# Patient Record
Sex: FEMALE | Race: WHITE | NOT HISPANIC OR LATINO | URBAN - METROPOLITAN AREA
[De-identification: names, ages, dates, MRNs, and addresses within clinical notes are randomized per-mention and may not be internally consistent; named-entity substitution may affect disease eponyms.]

---

## 2021-02-04 ENCOUNTER — OFFICE VISIT (OUTPATIENT)
Dept: URBAN - METROPOLITAN AREA CLINIC 78 | Facility: CLINIC | Age: 66
End: 2021-02-04
Payer: MEDICARE

## 2021-02-04 VITALS
TEMPERATURE: 98.2 F | HEART RATE: 93 BPM | HEIGHT: 64 IN | DIASTOLIC BLOOD PRESSURE: 81 MMHG | WEIGHT: 170.2 LBS | BODY MASS INDEX: 29.06 KG/M2 | SYSTOLIC BLOOD PRESSURE: 133 MMHG | RESPIRATION RATE: 16 BRPM

## 2021-02-04 DIAGNOSIS — Q43.8 REDUNDANT COLON: ICD-10-CM

## 2021-02-04 DIAGNOSIS — Z12.11 SCREENING COLONOSCOPY: ICD-10-CM

## 2021-02-04 DIAGNOSIS — L90.5 SCAR OF ABDOMINAL WALL: ICD-10-CM

## 2021-02-04 DIAGNOSIS — Q63.1 HORSESHOE KIDNEY: ICD-10-CM

## 2021-02-04 DIAGNOSIS — R19.00 RETROPERITONEAL MASS: ICD-10-CM

## 2021-02-04 DIAGNOSIS — G20 PARKINSON DISEASE: ICD-10-CM

## 2021-02-04 DIAGNOSIS — R26.2 AMBULATORY DYSFUNCTION: ICD-10-CM

## 2021-02-04 DIAGNOSIS — K59.01 CONSTIPATION BY DELAYED COLONIC TRANSIT: ICD-10-CM

## 2021-02-04 DIAGNOSIS — Q05.9 SPINA BIFIDA: ICD-10-CM

## 2021-02-04 PROBLEM — 10331000132107 TORTUOUS COLON: Status: ACTIVE | Noted: 2021-02-04

## 2021-02-04 PROBLEM — 41729002 HORSESHOE KIDNEY: Status: ACTIVE | Noted: 2021-02-04

## 2021-02-04 PROBLEM — 67531005 SPINA BIFIDA: Status: ACTIVE | Noted: 2021-02-04

## 2021-02-04 PROCEDURE — 99204 OFFICE O/P NEW MOD 45 MIN: CPT | Performed by: INTERNAL MEDICINE

## 2021-02-04 PROCEDURE — G8482 FLU IMMUNIZE ORDER/ADMIN: HCPCS | Performed by: INTERNAL MEDICINE

## 2021-02-04 RX ORDER — SODIUM, POTASSIUM,MAG SULFATES 17.5-3.13G
177 ML DAY 1 AND 177 ML DAY 2 SOLUTION, RECONSTITUTED, ORAL ORAL
Qty: 354 MILLILITER | Refills: 0 | OUTPATIENT
Start: 2021-02-04

## 2021-02-04 RX ORDER — ALENDRONATE SODIUM 70 MG/1
1 TABLET 30 MINUTES BEFORE THE FIRST FOOD, BEVERAGE OR MEDICINE OF THE DAY WITH PLAIN WATER TABLET ORAL
Status: ACTIVE | COMMUNITY

## 2021-02-04 RX ORDER — CARBIDOPA, LEVODOPA AND ENTACAPONE 25; 100; 200 MG/1; MG/1; MG/1
1 TABLET TABLET, FILM COATED ORAL ONCE A DAY
Status: ACTIVE | COMMUNITY

## 2021-02-04 RX ORDER — PSYLLIUM HUSK 0.4 G
2 CAPSULES WITH 8 OUNCES OF LIQUID CAPSULE ORAL THREE TIMES A DAY
Status: ACTIVE | COMMUNITY

## 2021-02-04 RX ORDER — LINACLOTIDE 145 UG/1
1 CAPSULE AT LEAST 30 MINUTES BEFORE THE FIRST MEAL OF THE DAY ON AN EMPTY STOMACH CAPSULE, GELATIN COATED ORAL ONCE A DAY
Status: ACTIVE | COMMUNITY

## 2021-02-04 RX ORDER — LINACLOTIDE 290 UG/1
1 CAPSULE AT LEAST 30 MINUTES BEFORE THE FIRST MEAL OF THE DAY ON AN EMPTY STOMACH CAPSULE, GELATIN COATED ORAL ONCE A DAY
Qty: 90 | Refills: 0 | OUTPATIENT

## 2021-02-04 NOTE — HPI-TODAY'S VISIT:
Patient was referred by Dr. Jo-Ann Henning  A copy of this document will be sent to the physician.   Patient has personal history of mentally Challenged female with personal hx of SPina bifida  with Parkinsons disease and lives at [facility] living facility   Patient has constipation  No BM  for 1 weeks  PCP has started her on Linzess 145 since Oct 2020 but is is not wirking  May years ago she went to hospital for constipation  Last colonoscopy was 10 years ago    Personal history of pelvic mass  s/p appendectomy , extensive retroperitoneal dissection with palpation of right retroperitoneal mass  Left iliac note biopsy in 2009   CT scan 2003  [facility]  Cyst in right ovary 5.5 cm    Patient wears a pull up for accidents mostly bladder

## 2021-04-21 ENCOUNTER — TELEPHONE ENCOUNTER (OUTPATIENT)
Dept: URBAN - METROPOLITAN AREA CLINIC 78 | Facility: CLINIC | Age: 66
End: 2021-04-21

## 2022-12-22 ENCOUNTER — P2P PATIENT RECORD (OUTPATIENT)
Age: 67
End: 2022-12-22

## 2023-01-24 ENCOUNTER — WEB ENCOUNTER (OUTPATIENT)
Dept: URBAN - METROPOLITAN AREA CLINIC 78 | Facility: CLINIC | Age: 68
End: 2023-01-24

## 2023-01-30 ENCOUNTER — OFFICE VISIT (OUTPATIENT)
Dept: URBAN - METROPOLITAN AREA CLINIC 78 | Facility: CLINIC | Age: 68
End: 2023-01-30
Payer: MEDICARE

## 2023-01-30 ENCOUNTER — DASHBOARD ENCOUNTERS (OUTPATIENT)
Age: 68
End: 2023-01-30

## 2023-01-30 VITALS
DIASTOLIC BLOOD PRESSURE: 70 MMHG | HEIGHT: 64 IN | HEART RATE: 81 BPM | TEMPERATURE: 97.8 F | SYSTOLIC BLOOD PRESSURE: 113 MMHG | BODY MASS INDEX: 23.73 KG/M2 | WEIGHT: 139 LBS

## 2023-01-30 DIAGNOSIS — Z12.11 COLON CANCER SCREENING: ICD-10-CM

## 2023-01-30 DIAGNOSIS — Q76.0 SPINA BIFIDA OCCULTA: ICD-10-CM

## 2023-01-30 DIAGNOSIS — G20 PARKINSON DISEASE: ICD-10-CM

## 2023-01-30 DIAGNOSIS — K59.01 SLOW TRANSIT CONSTIPATION: ICD-10-CM

## 2023-01-30 DIAGNOSIS — R26.2 AMBULATORY DYSFUNCTION: ICD-10-CM

## 2023-01-30 PROBLEM — 228158008: Status: ACTIVE | Noted: 2021-02-04

## 2023-01-30 PROBLEM — 76916001: Status: ACTIVE | Noted: 2023-01-30

## 2023-01-30 PROBLEM — 35298007: Status: ACTIVE | Noted: 2023-01-30

## 2023-01-30 PROBLEM — 49049000 PARKINSON DISEASE: Status: ACTIVE | Noted: 2021-02-04

## 2023-01-30 PROCEDURE — 99203 OFFICE O/P NEW LOW 30 MIN: CPT | Performed by: INTERNAL MEDICINE

## 2023-01-30 RX ORDER — CARBIDOPA, LEVODOPA AND ENTACAPONE 25; 100; 200 MG/1; MG/1; MG/1
1 TABLET TABLET, FILM COATED ORAL ONCE A DAY
Status: ACTIVE | COMMUNITY

## 2023-01-30 RX ORDER — LINACLOTIDE 290 UG/1
1 CAPSULE AT LEAST 30 MINUTES BEFORE THE FIRST MEAL OF THE DAY ON AN EMPTY STOMACH CAPSULE, GELATIN COATED ORAL ONCE A DAY
Qty: 90 | Refills: 0 | Status: ACTIVE | COMMUNITY

## 2023-01-30 RX ORDER — SODIUM, POTASSIUM,MAG SULFATES 17.5-3.13G
177 ML DAY 1 AND 177 ML DAY 2 SOLUTION, RECONSTITUTED, ORAL ORAL
Qty: 354 MILLILITER | Refills: 0 | Status: ACTIVE | COMMUNITY
Start: 2021-02-04

## 2023-01-30 RX ORDER — PSYLLIUM HUSK 0.4 G
2 CAPSULES WITH 8 OUNCES OF LIQUID CAPSULE ORAL THREE TIMES A DAY
Status: ON HOLD | COMMUNITY

## 2023-01-30 RX ORDER — ALENDRONATE SODIUM 70 MG/1
1 TABLET 30 MINUTES BEFORE THE FIRST FOOD, BEVERAGE OR MEDICINE OF THE DAY WITH PLAIN WATER TABLET ORAL
Status: ACTIVE | COMMUNITY

## 2023-01-30 NOTE — HPI-TODAY'S VISIT:
Patient was referred by Dr. Jo-Ann Henning  A copy of this document will be sent to the physician.   The patient presents for a surveilance colonoscopy There is no family history of colon polyps or cancer. Patient denies change in bowel habits, appetite, and weight. Patient denies bleeding per rectum. Last colonoscopy: 2007 -2009  Patient has history of severe constipation but has been addressed since with stool softeners and Linzess daily.  Now she has a tendency to have more loose stools than constipation.  He has a history of ex lap to explore an extrinsic compression from a cyst but the cyst was not removed by the surgeon.  It was causing an extrinsic compression on the colon which was noted during the colonoscopy.    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

## 2023-01-30 NOTE — PHYSICAL EXAM CONSTITUTIONAL:
well developed, well nourished , in no acute distress , ambulating difficulty , needs support and needs physical help normal communication ability

## 2023-02-02 ENCOUNTER — TELEPHONE ENCOUNTER (OUTPATIENT)
Dept: URBAN - METROPOLITAN AREA CLINIC 78 | Facility: CLINIC | Age: 68
End: 2023-02-02

## 2023-02-02 RX ORDER — SODIUM, POTASSIUM,MAG SULFATES 17.5-3.13G
177 ML DAY 1 AND 177 ML DAY 2 SOLUTION, RECONSTITUTED, ORAL ORAL
Qty: 354 MILLILITER | Refills: 0
Start: 2021-02-04 | End: 2023-02-09

## 2023-02-07 PROBLEM — 35298007 CONSTIPATION BY DELAYED COLONIC TRANSIT: Status: ACTIVE | Noted: 2021-02-04

## 2023-03-01 ENCOUNTER — OFFICE VISIT (OUTPATIENT)
Dept: URBAN - METROPOLITAN AREA MEDICAL CENTER 10 | Facility: MEDICAL CENTER | Age: 68
End: 2023-03-01
Payer: MEDICARE

## 2023-03-01 DIAGNOSIS — D12.4 ADENOMA OF DESCENDING COLON: ICD-10-CM

## 2023-03-01 DIAGNOSIS — D12.0 ADENOMA OF CECUM: ICD-10-CM

## 2023-03-01 DIAGNOSIS — Z12.11 COLON CANCER SCREENING: ICD-10-CM

## 2023-03-01 PROCEDURE — 45380 COLONOSCOPY AND BIOPSY: CPT | Performed by: INTERNAL MEDICINE

## 2023-03-01 PROCEDURE — 45385 COLONOSCOPY W/LESION REMOVAL: CPT | Performed by: INTERNAL MEDICINE
